# Patient Record
(demographics unavailable — no encounter records)

---

## 2024-11-14 NOTE — HISTORY OF PRESENT ILLNESS
[FreeTextEntry1] : 68 yo man PMHx of CAD c/b late presentation MI s/p YUMIKO c/b ICM HFrEF and LV thrombus and LA thrombus (resolved) who is here as f/u  11/14/24 ROS negative 09/30/24 ROS negative 08/29/24 ROS negative

## 2024-11-14 NOTE — DISCUSSION/SUMMARY
[FreeTextEntry1] : 66 yo man PMHx of CAD c/b late presentation MI s/p YUMIKO c/b ICM HFrEF and LV thrombus and LA thrombus (resolved) who is here as f/u  EKG 08/29/24: NSR, RBBB, Q wave inferior leads c/w prior infarct  Assessment: 1. CAD s/p YUMIKO, recent late stage MI with hospitalization  2. ICM HFrEF - euvolemic (warm and dry) 3. LA and LV thrombus - resolved 4. Mild MR and TR   Plan: 1. Plavix 75mg PO, ASA 81mg PO QD, and high intensity statin 2. Resolution of intracardiac thrombus, now off AC 3. GDMT: metop succinate 50mg PO QD, losartan 25mg PO QD, and Farxiga 10mg PO QD; no BP room for MRA yet 4. TTE done today to check for recovery of EF, will review 5. RTC 3mo   During non face-to-face time, I reviewed relevant portions of the patients medical record. During face-to-face time, I took a relevant history and examined the patient. I also explained differential diagnoses, relevant cardiac diagnoses, workup, and management plan. Moderate complexity.    John ANSARI (John)  of Cardiology [employer] [facility] at [facility] [place]

## 2025-02-13 NOTE — HISTORY OF PRESENT ILLNESS
[FreeTextEntry1] : 68 yo man PMHx of CAD c/b late presentation MI s/p YUMIKO c/b ICM HFrEF and LV thrombus and LA thrombus (resolved)   02/13/25 ROS negative 11/14/24 ROS negative 09/30/24 ROS negative 08/29/24 ROS negative

## 2025-02-13 NOTE — DISCUSSION/SUMMARY
[FreeTextEntry1] : 66 yo man PMHx of CAD c/b late presentation MI s/p YUMIKO c/b ICM HFrEF and LV thrombus and LA thrombus (resolved) who is here as f/u  Assessment: 1. CAD s/p YUMIKO, recent late stage MI with hospitalization  2. ICM HFrEF - euvolemic (warm and dry) -> improved to 49% w/ WMA, mild dyspnea on exertion 3. LA and LV thrombus - resolved 4. Mild MR and TR   Plan: 1. Plavix 75mg PO, ASA 81mg PO QD, and high intensity statin, will continue DAPT indefinitely  2. Resolution of intracardiac thrombus, now off AC 3. GDMT: metop succinate 50mg PO QD, losartan 25mg PO QD, and Farxiga 10mg PO QD 4. RTC 3mo w/ TTE   During non face-to-face time, I reviewed relevant portions of the patients medical record. During face-to-face time, I took a relevant history and examined the patient. I also explained differential diagnoses, relevant cardiac diagnoses, workup, and management plan. Moderate complexity.    John ANSARI (John)  of Cardiology [facility] at [facility] [place]

## 2025-02-13 NOTE — HISTORY OF PRESENT ILLNESS
[FreeTextEntry1] : 66 yo man PMHx of CAD c/b late presentation MI s/p YUMIKO c/b ICM HFrEF and LV thrombus and LA thrombus (resolved)   02/13/25 ROS negative 11/14/24 ROS negative 09/30/24 ROS negative 08/29/24 ROS negative

## 2025-02-13 NOTE — DISCUSSION/SUMMARY
[FreeTextEntry1] : 66 yo man PMHx of CAD c/b late presentation MI s/p YUMIKO c/b ICM HFrEF and LV thrombus and LA thrombus (resolved) who is here as f/u  Assessment: 1. CAD s/p YUMIKO, recent late stage MI with hospitalization  2. ICM HFrEF - euvolemic (warm and dry) -> improved to 49% w/ WMA, mild dyspnea on exertion 3. LA and LV thrombus - resolved 4. Mild MR and TR   Plan: 1. Plavix 75mg PO, ASA 81mg PO QD, and high intensity statin, will continue DAPT indefinitely  2. Resolution of intracardiac thrombus, now off AC 3. GDMT: metop succinate 50mg PO QD, losartan 25mg PO QD, and Farxiga 10mg PO QD 4. RTC 3mo w/ TTE   During non face-to-face time, I reviewed relevant portions of the patients medical record. During face-to-face time, I took a relevant history and examined the patient. I also explained differential diagnoses, relevant cardiac diagnoses, workup, and management plan. Moderate complexity.    John ANSARI (John)  of Cardiology [facility] at [facility]

## 2025-05-15 NOTE — HISTORY OF PRESENT ILLNESS
[FreeTextEntry1] : 69 yo man PMHx of CAD c/b late presentation MI s/p YUMIKO c/b ICM HFrEF and LV thrombus and LA thrombus (resolved)   05/15/25 02/13/25 ROS negative 11/14/24 ROS negative 09/30/24 ROS negative 08/29/24 ROS negative

## 2025-05-15 NOTE — DISCUSSION/SUMMARY
[FreeTextEntry1] : 67 yo man PMHx of CAD c/b late presentation MI s/p YUMIKO c/b ICM HFrEF and LV thrombus and LA thrombus (resolved)   Assessment: 1. CAD s/p YUMIKO, recent late stage MI with hospitalization  2. ICM HFrEF - euvolemic (warm and dry) -> improved to 49% w/ WMA, mild dyspnea on exertion 3. LA and LV thrombus - resolved 4. Mild MR and TR   Plan: 1. Plavix 75mg PO, ASA 81mg PO QD, and high intensity statin, will continue DAPT indefinitely  2. Resolution of intracardiac thrombus, now off AC 3. GDMT: metop succinate 50mg PO QD, losartan 25mg PO QD, and Farxiga 10mg PO QD 4. TTE today, will review 5. RTC 6mo   During non face-to-face time, I reviewed relevant portions of the patients medical record. During face-to-face time, I took a relevant history and examined the patient. I also explained differential diagnoses, relevant cardiac diagnoses, workup, and management plan. Moderate complexity.    John ANSARI (John)  of Cardiology [facility] at [facility]